# Patient Record
Sex: FEMALE | URBAN - METROPOLITAN AREA
[De-identification: names, ages, dates, MRNs, and addresses within clinical notes are randomized per-mention and may not be internally consistent; named-entity substitution may affect disease eponyms.]

---

## 2021-12-28 PROCEDURE — PSEU9964 TRICHOMONAS VAGINALIS BY NUCLEIC ACID AMPLIFICATION: Performed by: CLINICAL MEDICAL LABORATORY

## 2021-12-28 PROCEDURE — 88175 CYTOPATH C/V AUTO FLUID REDO: CPT | Performed by: CLINICAL MEDICAL LABORATORY

## 2021-12-28 PROCEDURE — 87591 N.GONORRHOEAE DNA AMP PROB: CPT | Performed by: CLINICAL MEDICAL LABORATORY

## 2021-12-28 PROCEDURE — 87491 CHLMYD TRACH DNA AMP PROBE: CPT | Performed by: CLINICAL MEDICAL LABORATORY

## 2021-12-28 PROCEDURE — PSEU9913 CHLAMYDIA/GONORRHEA BY NUCLEIC ACID AMPLIFICATION: Performed by: CLINICAL MEDICAL LABORATORY

## 2021-12-28 PROCEDURE — 87661 TRICHOMONAS VAGINALIS AMPLIF: CPT | Performed by: CLINICAL MEDICAL LABORATORY

## 2021-12-29 ENCOUNTER — LAB REQUISITION (OUTPATIENT)
Dept: LAB | Age: 21
End: 2021-12-29

## 2021-12-29 DIAGNOSIS — Z12.4 ENCOUNTER FOR SCREENING FOR MALIGNANT NEOPLASM OF CERVIX: ICD-10-CM

## 2021-12-30 LAB
C TRACH RRNA CVX QL NAA+PROBE: NEGATIVE
HOLD SPECIMEN: NORMAL
Lab: NORMAL
N GONORRHOEA RRNA CVX QL NAA+PROBE: NEGATIVE
T VAGINALIS RRNA CVX QL NAA+PROBE: NEGATIVE

## 2022-01-03 LAB
CASE RPRT: NORMAL
CLINICAL INFO: NORMAL
CYTOLOGY CVX/VAG STUDY: NORMAL
PAP EDUCATIONAL NOTE: NORMAL
SPECIMEN ADEQUACY: NORMAL

## 2022-05-31 ENCOUNTER — LAB REQUISITION (OUTPATIENT)
Dept: LAB | Age: 22
End: 2022-05-31

## 2022-05-31 DIAGNOSIS — T74.21XA ADULT SEXUAL ABUSE, CONFIRMED, INITIAL ENCOUNTER: ICD-10-CM

## 2022-05-31 DIAGNOSIS — Z11.3 ENCOUNTER FOR SCREENING FOR INFECTIONS WITH A PREDOMINANTLY SEXUAL MODE OF TRANSMISSION: ICD-10-CM

## 2022-05-31 PROCEDURE — 87491 CHLMYD TRACH DNA AMP PROBE: CPT | Performed by: CLINICAL MEDICAL LABORATORY

## 2022-05-31 PROCEDURE — 87661 TRICHOMONAS VAGINALIS AMPLIF: CPT | Performed by: CLINICAL MEDICAL LABORATORY

## 2022-05-31 PROCEDURE — PSEU9964 TRICHOMONAS VAGINALIS BY NUCLEIC ACID AMPLIFICATION: Performed by: CLINICAL MEDICAL LABORATORY

## 2022-05-31 PROCEDURE — PSEU9913 CHLAMYDIA/GONORRHEA BY NUCLEIC ACID AMPLIFICATION: Performed by: CLINICAL MEDICAL LABORATORY

## 2022-05-31 PROCEDURE — 87591 N.GONORRHOEAE DNA AMP PROB: CPT | Performed by: CLINICAL MEDICAL LABORATORY

## 2022-06-01 LAB
C TRACH RRNA SPEC QL NAA+PROBE: NEGATIVE
Lab: NORMAL
N GONORRHOEA RRNA SPEC QL NAA+PROBE: NEGATIVE
T VAGINALIS RRNA VAG QL NAA+PROBE: NEGATIVE

## 2022-08-22 ENCOUNTER — LAB REQUISITION (OUTPATIENT)
Dept: LAB | Age: 22
End: 2022-08-22

## 2022-08-22 DIAGNOSIS — Z12.4 ENCOUNTER FOR SCREENING FOR MALIGNANT NEOPLASM OF CERVIX: ICD-10-CM

## 2022-08-22 PROCEDURE — PSEU9964 TRICHOMONAS VAGINALIS BY NUCLEIC ACID AMPLIFICATION: Performed by: CLINICAL MEDICAL LABORATORY

## 2022-08-22 PROCEDURE — PSEU9913 CHLAMYDIA/GONORRHEA BY NUCLEIC ACID AMPLIFICATION: Performed by: CLINICAL MEDICAL LABORATORY

## 2022-08-22 PROCEDURE — 87591 N.GONORRHOEAE DNA AMP PROB: CPT | Performed by: CLINICAL MEDICAL LABORATORY

## 2022-08-22 PROCEDURE — 88175 CYTOPATH C/V AUTO FLUID REDO: CPT | Performed by: CLINICAL MEDICAL LABORATORY

## 2022-08-22 PROCEDURE — 87491 CHLMYD TRACH DNA AMP PROBE: CPT | Performed by: CLINICAL MEDICAL LABORATORY

## 2022-08-22 PROCEDURE — 87661 TRICHOMONAS VAGINALIS AMPLIF: CPT | Performed by: CLINICAL MEDICAL LABORATORY

## 2022-08-23 LAB
C TRACH RRNA CVX QL NAA+PROBE: NEGATIVE
Lab: NORMAL
N GONORRHOEA RRNA CVX QL NAA+PROBE: NEGATIVE
T VAGINALIS RRNA CVX QL NAA+PROBE: NEGATIVE

## 2022-08-24 LAB — HOLD SPECIMEN: NORMAL

## 2023-03-19 ENCOUNTER — APPOINTMENT (OUTPATIENT)
Dept: CT IMAGING | Facility: CLINIC | Age: 23
End: 2023-03-19
Attending: PHYSICIAN ASSISTANT
Payer: COMMERCIAL

## 2023-03-19 ENCOUNTER — HOSPITAL ENCOUNTER (EMERGENCY)
Facility: CLINIC | Age: 23
Discharge: HOME | End: 2023-03-19
Attending: PHYSICIAN ASSISTANT | Admitting: PHYSICIAN ASSISTANT
Payer: COMMERCIAL

## 2023-03-19 VITALS
HEIGHT: 65 IN | SYSTOLIC BLOOD PRESSURE: 117 MMHG | TEMPERATURE: 98.7 F | WEIGHT: 140 LBS | RESPIRATION RATE: 18 BRPM | OXYGEN SATURATION: 100 % | HEART RATE: 72 BPM | DIASTOLIC BLOOD PRESSURE: 77 MMHG | BODY MASS INDEX: 23.32 KG/M2

## 2023-03-19 DIAGNOSIS — K12.2 INFECTION OF MOUTH: ICD-10-CM

## 2023-03-19 LAB
ANION GAP SERPL CALCULATED.3IONS-SCNC: 9 MMOL/L (ref 7–15)
BASOPHILS # BLD AUTO: 0 10E3/UL (ref 0–0.2)
BASOPHILS NFR BLD AUTO: 0 %
BUN SERPL-MCNC: 12.4 MG/DL (ref 6–20)
CALCIUM SERPL-MCNC: 9.7 MG/DL (ref 8.6–10)
CHLORIDE SERPL-SCNC: 102 MMOL/L (ref 98–107)
CREAT SERPL-MCNC: 0.67 MG/DL (ref 0.51–0.95)
DEPRECATED HCO3 PLAS-SCNC: 28 MMOL/L (ref 22–29)
EOSINOPHIL # BLD AUTO: 0.2 10E3/UL (ref 0–0.7)
EOSINOPHIL NFR BLD AUTO: 2 %
ERYTHROCYTE [DISTWIDTH] IN BLOOD BY AUTOMATED COUNT: 12 % (ref 10–15)
GFR SERPL CREATININE-BSD FRML MDRD: >90 ML/MIN/1.73M2
GLUCOSE SERPL-MCNC: 91 MG/DL (ref 70–99)
HCG SERPL QL: NEGATIVE
HCT VFR BLD AUTO: 44.8 % (ref 35–47)
HGB BLD-MCNC: 14.7 G/DL (ref 11.7–15.7)
IMM GRANULOCYTES # BLD: 0 10E3/UL
IMM GRANULOCYTES NFR BLD: 0 %
LYMPHOCYTES # BLD AUTO: 2.6 10E3/UL (ref 0.8–5.3)
LYMPHOCYTES NFR BLD AUTO: 28 %
MCH RBC QN AUTO: 30.1 PG (ref 26.5–33)
MCHC RBC AUTO-ENTMCNC: 32.8 G/DL (ref 31.5–36.5)
MCV RBC AUTO: 92 FL (ref 78–100)
MONOCYTES # BLD AUTO: 0.6 10E3/UL (ref 0–1.3)
MONOCYTES NFR BLD AUTO: 6 %
NEUTROPHILS # BLD AUTO: 6 10E3/UL (ref 1.6–8.3)
NEUTROPHILS NFR BLD AUTO: 64 %
NRBC # BLD AUTO: 0 10E3/UL
NRBC BLD AUTO-RTO: 0 /100
PLATELET # BLD AUTO: 256 10E3/UL (ref 150–450)
POTASSIUM SERPL-SCNC: 4.2 MMOL/L (ref 3.4–5.3)
RBC # BLD AUTO: 4.88 10E6/UL (ref 3.8–5.2)
SODIUM SERPL-SCNC: 139 MMOL/L (ref 136–145)
WBC # BLD AUTO: 9.4 10E3/UL (ref 4–11)

## 2023-03-19 PROCEDURE — 84703 CHORIONIC GONADOTROPIN ASSAY: CPT | Performed by: PHYSICIAN ASSISTANT

## 2023-03-19 PROCEDURE — 70491 CT SOFT TISSUE NECK W/DYE: CPT

## 2023-03-19 PROCEDURE — 36415 COLL VENOUS BLD VENIPUNCTURE: CPT | Performed by: PHYSICIAN ASSISTANT

## 2023-03-19 PROCEDURE — 250N000011 HC RX IP 250 OP 636: Performed by: PHYSICIAN ASSISTANT

## 2023-03-19 PROCEDURE — 80048 BASIC METABOLIC PNL TOTAL CA: CPT | Performed by: PHYSICIAN ASSISTANT

## 2023-03-19 PROCEDURE — 85025 COMPLETE CBC W/AUTO DIFF WBC: CPT | Performed by: PHYSICIAN ASSISTANT

## 2023-03-19 PROCEDURE — 250N000009 HC RX 250: Performed by: PHYSICIAN ASSISTANT

## 2023-03-19 PROCEDURE — 99285 EMERGENCY DEPT VISIT HI MDM: CPT | Mod: 25

## 2023-03-19 RX ORDER — IOPAMIDOL 755 MG/ML
100 INJECTION, SOLUTION INTRAVASCULAR ONCE
Status: COMPLETED | OUTPATIENT
Start: 2023-03-19 | End: 2023-03-19

## 2023-03-19 RX ORDER — LEVONORGESTREL 19.5 MG/1
1 INTRAUTERINE DEVICE INTRAUTERINE
COMMUNITY
Start: 2022-02-25 | End: 2027-02-25

## 2023-03-19 RX ADMIN — IOPAMIDOL 100 ML: 755 INJECTION, SOLUTION INTRAVENOUS at 16:41

## 2023-03-19 RX ADMIN — SODIUM CHLORIDE 60 ML: 900 INJECTION INTRAVENOUS at 16:42

## 2023-03-19 ASSESSMENT — ACTIVITIES OF DAILY LIVING (ADL): ADLS_ACUITY_SCORE: 35

## 2023-03-19 NOTE — ED PROVIDER NOTES
History     Chief Complaint:  Oral Swelling     HPI   Loly Gaines is a 22 year old female who presents to the ED with concern for oral swelling.  Patient reports onset of oral swelling along her right lower anterior gumline that began approximately 10-14 days ago.  She describes it as painful and expanding in size, becoming more painful over the last 3-4 days.  No difficulty swallowing.  No difficulty with breathing.  She does not appreciate any discharge to her mouth.  No fevers or chills.  She notes a history of similar swelling in her mouth a few years ago which she had to have surgically removed due to its increasing size. She has not taken anything for her symptoms.      Independent Historian:   None - Patient Only    Review of External Notes:   Urgent care visit today:  Loly Gaines is a 22 y.o. female who presents for gum abnormality    The history is provided by the patient.     22 y.o female to clinic with report of 3-4 days of right sided gum/floor of mouth abnormality. she reports she has felt a pain/discomfort in right cheek, lower gums over the past several days. States pain is increasing so comes now for evaluation. Reports past history of similar issue four years ago for which needed ENT evaluation for resolution. On chart review, seen by ENT in may 2019 with mucocele that had to be removed in OR. Reports no fevers, chills, trauma to face or other abnormalities.       A/P  Tried to reassured patient of no specific exam findings. She is concerned given past history of mucocele with need for OR intervention. Will refer to ENT for evaluation. Advised Tylenol or Motrin for pain control. She requested work letter for the day, work excuse provided. She is encouraged to f/u in ED if worsening pain or no contact with ENT in the next 48 hours.     ENT visit on 5/3/19:  Loly Gaines is a 18 y.o. female referred by [facility] urgent care for possible mucocele. This has been going on for several weeks. The  "patient describes that it is getting bigger. It is not painful. No other concerns. No fevers, no fatigue. She has never had this before.    Plan:     Plan for excision of likely mucocele with Dr. Messina at a later date. We discussed anatomy and the procedure and she would like to proceed.    ROS:  Review of Systems  ROS neg other than the symptoms noted above in the HPI.    Allergies:  No Known Allergies     Medications:    levonorgestrel (KYLEENA) 19.5 MG IUD        Past Medical History:    Oral mucocele    Past Surgical History:    Oral mucocele surgery     Social History:  Here with significant other    PCP: No primary care provider on file.     Physical Exam     Patient Vitals for the past 24 hrs:   BP Temp Temp src Pulse Resp SpO2 Height Weight   03/19/23 1442 117/77 98.7  F (37.1  C) Temporal 72 18 100 % 1.651 m (5' 5\") 63.5 kg (140 lb)        Physical Exam  Constitutional: Pleasant. Cooperative.   Eyes: Pupils equally round and reactive  HENT: Head is normal in appearance. Moist mucous membranes. No oropharyngeal erythema. No exudates. No palatal asymmetry. Uvula midline. No trismus. No muffled voice. Tolerating their secretions. Mild TTP to right inferior gum line, however I cannot palpate any abnormality.  Cardiovascular: Regular rate and rhythm.  Respiratory: Normal respiratory effort, lungs are clear bilaterally.  Neck: Normal ROM. No preauricular, postauricular, tonsillar, submandibular, submental, or cervical lymphadenopathy.   Skin: Normal, without rash.  Neurologic: Cranial nerves grossly intact. Alert.  Nursing notes and vital signs reviewed.      Emergency Department Course     Imaging:  Soft tissue neck CT w contrast   Final Result   IMPRESSION:    1. Mild inflammatory changes involving the right  buccal soft tissues.   2. No underlying dental abscess.   3. No soft tissue abscess.            Report per radiology    Laboratory:  Labs Ordered and Resulted from Time of ED Arrival to Time of ED " Departure   BASIC METABOLIC PANEL - Normal       Result Value    Sodium 139      Potassium 4.2      Chloride 102      Carbon Dioxide (CO2) 28      Anion Gap 9      Urea Nitrogen 12.4      Creatinine 0.67      Calcium 9.7      Glucose 91      GFR Estimate >90     HCG QUALITATIVE PREGNANCY - Normal    hCG Serum Qualitative Negative     CBC WITH PLATELETS AND DIFFERENTIAL    WBC Count 9.4      RBC Count 4.88      Hemoglobin 14.7      Hematocrit 44.8      MCV 92      MCH 30.1      MCHC 32.8      RDW 12.0      Platelet Count 256      % Neutrophils 64      % Lymphocytes 28      % Monocytes 6      % Eosinophils 2      % Basophils 0      % Immature Granulocytes 0      NRBCs per 100 WBC 0      Absolute Neutrophils 6.0      Absolute Lymphocytes 2.6      Absolute Monocytes 0.6      Absolute Eosinophils 0.2      Absolute Basophils 0.0      Absolute Immature Granulocytes 0.0      Absolute NRBCs 0.0        Emergency Department Course & Assessments:    Interventions:  Medications   iopamidol (ISOVUE-370) solution 100 mL (100 mLs Intravenous $Given 3/19/23 1641)   sodium chloride 0.9 % bag 500mL for CT scan flush use (60 mLs Intravenous $Given 3/19/23 1642)        Assessments:  I evaluated the patient  Discussed results with patient    Independent Interpretation (X-rays, CTs, rhythm strip):  None    Consultations/Discussion of Management or Tests:  None     Social Determinants of Health affecting care:   None    Disposition:  The patient was discharged to home.     Impression & Plan      Medical Decision Making:  Loly Gaines is a 22 year old female who presents to to the ED for evaluation of oral swelling.  Patient has a history of oral mucocele and needing a surgically removed.  She is concerned for recurrence versus infection of the mouth.  See HPI as above for additional details.  Vitals and physical exam as above.  Differential was broad included abscess, recurrent mucocele, salivary gland abnormality, cellulitis, amongst  others. Work up obtained as above suggestive for inflammatory changes to area of right buccal soft tissues, which was area of concern. Discussed unclear etiology of symptoms. Will trial course of antibiotics in case this represents infectious process. Discussed Tylenol and ibuprofen for pain. Felt patient was safe for discharge to home. Discussed reasons to return. All questions answered. Patient discharged to home in stable condition.    Diagnosis:    ICD-10-CM    1. Infection of mouth  K12.2            Discharge Medications:  Discharge Medication List as of 3/19/2023  5:37 PM      START taking these medications    Details   amoxicillin-clavulanate (AUGMENTIN) 875-125 MG tablet Take 1 tablet by mouth 2 times daily for 7 days, Disp-14 tablet, R-0, E-Prescribe            This record was created at least in part using electronic voice recognition software, so please excuse any typographical errors.         Sudhakar Neville PA-C  03/19/23 2035

## 2023-03-19 NOTE — DISCHARGE INSTRUCTIONS
The cause of your symptoms is unclear at this time. There is evidence for inflammatory changes to the tissue in the region of pain, but no clear infection/abscess. As discussed, we will put you on a course of antibiotics to see if this will change the trajectory of the pain/swelling. Should antibiotics not improve the swelling/pain, follow up with ENT.

## 2023-03-19 NOTE — ED TRIAGE NOTES
Pt reports right lower gum pain and swelling for the last couple days. Pt states similar pain 4 years ago and had salivary gland infection. Pt states she had surgery the last time.